# Patient Record
Sex: MALE | Race: WHITE | NOT HISPANIC OR LATINO | URBAN - METROPOLITAN AREA
[De-identification: names, ages, dates, MRNs, and addresses within clinical notes are randomized per-mention and may not be internally consistent; named-entity substitution may affect disease eponyms.]

---
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
## 2024-02-26 NOTE — ED PEDIATRIC TRIAGE NOTE - AUSCULTATION
Called patient.  He states over the past two weeks he has had increased right leg pain and tingling and has been to the ED twice.  He does not recall any inciting event.  He had an MRI on 2-15-24 in the ED which showed a small seroma and possible disc re-herniation.  He states the pain is the worst when going from sitting to standing and getting in and out of the car.  He denies weakness or b/b incontinence.  He has been taking naproxen, ibuprofen and tylenol. He uses ice and heat which helps.  He has an apt tomorrow to see JOSE A Rice.  He is comfortable waiting until his appt for future assessment and does not feel ED visit is necessary.  Pt appreciative.         No wheezing/clear to mild end expiratory wheeze or scattered expiratory wheeze